# Patient Record
(demographics unavailable — no encounter records)

---

## 2025-03-25 NOTE — PLAN
[FreeTextEntry1] : Pap smear with HPV testing drawn and sent.  Last Pap was ASCUS, HPV positive, and had colposcopy with ECC that was negative.  Will only proceed with colpo if more significantly changed.

## 2025-03-25 NOTE — HISTORY OF PRESENT ILLNESS
[Y] : Positive pregnancy history [TextBox_19] : Status post bilateral mastectomy [PapSmeardate] : 7/2/24 [TextBox_31] : ASCUS/Positive [BoneDensityDate] : 2023 [TextBox_37] : Through rheumatologist, normal [ColonoscopyDate] : 2022 [TextBox_43] : Normal [LMPDate] :  [PGxTotal] : 1 [[facility] HospitalxFulerm] : 1 [[facility] Care Corporationiving] : 1 [FreeTextEntry1] : 1  [TextBox_36] : History of high-grade SHIKHA, status post cold knife conization.  HPV positive. Pap 7/24 ASCUS/Pos; then 8/24 colpo with ECC Negative  [TextBox_34] : Personal history of breast cancer.  Status post bilateral mastectomy.  Had cancer syndrome testing years ago that was negative; is likely going to be repeating it this year, 2024. [Currently Active] : currently active [Men] : men [Vaginal] : vaginal

## 2025-03-25 NOTE — PHYSICAL EXAM
[FreeTextEntry2] : Normal, no lesions [FreeTextEntry1] : Normal, no lesions [FreeTextEntry4] : Normal, no lesions seen or palpated.  Small amount of clear discharge in vault [FreeTextEntry5] : Smooth, pink, no lesions.  No cervical motion tenderness.  Pap drawn and sent